# Patient Record
Sex: MALE | Race: WHITE | NOT HISPANIC OR LATINO | Employment: FULL TIME | URBAN - METROPOLITAN AREA
[De-identification: names, ages, dates, MRNs, and addresses within clinical notes are randomized per-mention and may not be internally consistent; named-entity substitution may affect disease eponyms.]

---

## 2023-08-14 ENCOUNTER — OFFICE VISIT (OUTPATIENT)
Dept: PRIMARY CARE | Facility: CLINIC | Age: 38
End: 2023-08-14
Payer: COMMERCIAL

## 2023-08-14 ENCOUNTER — LAB (OUTPATIENT)
Dept: LAB | Facility: LAB | Age: 38
End: 2023-08-14
Payer: COMMERCIAL

## 2023-08-14 VITALS
DIASTOLIC BLOOD PRESSURE: 64 MMHG | HEIGHT: 78 IN | OXYGEN SATURATION: 98 % | WEIGHT: 204.4 LBS | BODY MASS INDEX: 23.65 KG/M2 | HEART RATE: 61 BPM | TEMPERATURE: 97.5 F | SYSTOLIC BLOOD PRESSURE: 110 MMHG

## 2023-08-14 DIAGNOSIS — Z00.00 HEALTH MAINTENANCE EXAMINATION: ICD-10-CM

## 2023-08-14 DIAGNOSIS — Z23 ENCOUNTER FOR IMMUNIZATION: ICD-10-CM

## 2023-08-14 DIAGNOSIS — Z00.00 HEALTH MAINTENANCE EXAMINATION: Primary | ICD-10-CM

## 2023-08-14 LAB
ALANINE AMINOTRANSFERASE (SGPT) (U/L) IN SER/PLAS: 7 U/L (ref 10–52)
ALBUMIN (G/DL) IN SER/PLAS: 4.7 G/DL (ref 3.4–5)
ALKALINE PHOSPHATASE (U/L) IN SER/PLAS: 51 U/L (ref 33–120)
ANION GAP IN SER/PLAS: 9 MMOL/L (ref 10–20)
ASPARTATE AMINOTRANSFERASE (SGOT) (U/L) IN SER/PLAS: 19 U/L (ref 9–39)
BILIRUBIN TOTAL (MG/DL) IN SER/PLAS: 0.9 MG/DL (ref 0–1.2)
CALCIUM (MG/DL) IN SER/PLAS: 9 MG/DL (ref 8.6–10.3)
CARBON DIOXIDE, TOTAL (MMOL/L) IN SER/PLAS: 29 MMOL/L (ref 21–32)
CHLORIDE (MMOL/L) IN SER/PLAS: 107 MMOL/L (ref 98–107)
CHOLESTEROL (MG/DL) IN SER/PLAS: 160 MG/DL (ref 0–199)
CHOLESTEROL IN HDL (MG/DL) IN SER/PLAS: 69.5 MG/DL
CHOLESTEROL/HDL RATIO: 2.3
CREATININE (MG/DL) IN SER/PLAS: 0.95 MG/DL (ref 0.5–1.3)
ERYTHROCYTE DISTRIBUTION WIDTH (RATIO) BY AUTOMATED COUNT: 11.9 % (ref 11.5–14.5)
ERYTHROCYTE MEAN CORPUSCULAR HEMOGLOBIN CONCENTRATION (G/DL) BY AUTOMATED: 32.4 G/DL (ref 32–36)
ERYTHROCYTE MEAN CORPUSCULAR VOLUME (FL) BY AUTOMATED COUNT: 91 FL (ref 80–100)
ERYTHROCYTES (10*6/UL) IN BLOOD BY AUTOMATED COUNT: 5.23 X10E12/L (ref 4.5–5.9)
ESTIMATED AVERAGE GLUCOSE FOR HBA1C: 111 MG/DL
GFR MALE: >90 ML/MIN/1.73M2
GLUCOSE (MG/DL) IN SER/PLAS: 94 MG/DL (ref 74–99)
HEMATOCRIT (%) IN BLOOD BY AUTOMATED COUNT: 47.6 % (ref 41–52)
HEMOGLOBIN (G/DL) IN BLOOD: 15.4 G/DL (ref 13.5–17.5)
HEMOGLOBIN A1C/HEMOGLOBIN TOTAL IN BLOOD: 5.5 %
LDL: 73 MG/DL (ref 0–99)
LEUKOCYTES (10*3/UL) IN BLOOD BY AUTOMATED COUNT: 3.7 X10E9/L (ref 4.4–11.3)
PLATELETS (10*3/UL) IN BLOOD AUTOMATED COUNT: 197 X10E9/L (ref 150–450)
POTASSIUM (MMOL/L) IN SER/PLAS: 4.5 MMOL/L (ref 3.5–5.3)
PROTEIN TOTAL: 6.7 G/DL (ref 6.4–8.2)
SODIUM (MMOL/L) IN SER/PLAS: 140 MMOL/L (ref 136–145)
TRIGLYCERIDE (MG/DL) IN SER/PLAS: 89 MG/DL (ref 0–149)
UREA NITROGEN (MG/DL) IN SER/PLAS: 18 MG/DL (ref 6–23)
VLDL: 18 MG/DL (ref 0–40)

## 2023-08-14 PROCEDURE — 90715 TDAP VACCINE 7 YRS/> IM: CPT | Performed by: STUDENT IN AN ORGANIZED HEALTH CARE EDUCATION/TRAINING PROGRAM

## 2023-08-14 PROCEDURE — 90471 IMMUNIZATION ADMIN: CPT | Performed by: STUDENT IN AN ORGANIZED HEALTH CARE EDUCATION/TRAINING PROGRAM

## 2023-08-14 PROCEDURE — 85027 COMPLETE CBC AUTOMATED: CPT

## 2023-08-14 PROCEDURE — 36415 COLL VENOUS BLD VENIPUNCTURE: CPT

## 2023-08-14 PROCEDURE — 99395 PREV VISIT EST AGE 18-39: CPT | Performed by: STUDENT IN AN ORGANIZED HEALTH CARE EDUCATION/TRAINING PROGRAM

## 2023-08-14 PROCEDURE — 80053 COMPREHEN METABOLIC PANEL: CPT

## 2023-08-14 PROCEDURE — 80061 LIPID PANEL: CPT

## 2023-08-14 PROCEDURE — 1036F TOBACCO NON-USER: CPT | Performed by: STUDENT IN AN ORGANIZED HEALTH CARE EDUCATION/TRAINING PROGRAM

## 2023-08-14 PROCEDURE — 83036 HEMOGLOBIN GLYCOSYLATED A1C: CPT

## 2023-08-14 ASSESSMENT — PATIENT HEALTH QUESTIONNAIRE - PHQ9
2. FEELING DOWN, DEPRESSED OR HOPELESS: NOT AT ALL
1. LITTLE INTEREST OR PLEASURE IN DOING THINGS: NOT AT ALL
SUM OF ALL RESPONSES TO PHQ9 QUESTIONS 1 AND 2: 0

## 2023-08-14 NOTE — PATIENT INSTRUCTIONS
Recommendations for men annual wellness exam:   Colonoscopy at age 45 or earlier if positive family history of colon cancer   Discuss prostate cancer screening between ages 55-69 or sooner if family history of prostate cancer   One time screen for abdominal aortic aneurysm for men ages 65-75 who have ever smoked  Screening for lung cancer with low-dose CT in all adults age 55-77 who have a 30 pack-year smoking history and currently smoke or have quit within the past 15 years  Follow a healthy diet (Dash diet, Mediterranean diet)  Exercise 150 min/wk  Maintain healthy weight (BMI < 25)  Do not smoke   Alcohol in moderation (up to 2 drinks/day)   Get enough sleep (7-8 hours/night)  Make sure immunizations are up to date (influenza, pneumococcal, Tdap, shingles if age > 50)  Visit dentist twice yearly    TDaP given today

## 2023-08-17 ENCOUNTER — TELEPHONE (OUTPATIENT)
Dept: PRIMARY CARE | Facility: CLINIC | Age: 38
End: 2023-08-17
Payer: COMMERCIAL

## 2023-08-17 DIAGNOSIS — D72.819 LEUKOPENIA, UNSPECIFIED TYPE: ICD-10-CM

## 2023-08-17 NOTE — TELEPHONE ENCOUNTER
Per [facility] detailed message left on voicemail. Advised to call with questions/concerns. Lab order entered.

## 2023-08-17 NOTE — TELEPHONE ENCOUNTER
----- Message from Sachi Rg, DO sent at 8/16/2023  7:06 PM EDT -----  Please let the patient know that his white blood cell count is a little bit low.  I would like for him to recheck blood work in about 2-4 weeks.  Would you please put in an order for a CBC under leukopenia.  The rest of his blood work looks good

## 2023-08-18 ASSESSMENT — ENCOUNTER SYMPTOMS
DIZZINESS: 0
NAUSEA: 0
COUGH: 0
ABDOMINAL PAIN: 0
PALPITATIONS: 0
DIARRHEA: 0
CONSTIPATION: 0
FEVER: 0
WHEEZING: 0
NERVOUS/ANXIOUS: 0
NUMBNESS: 0
CHILLS: 0
FREQUENCY: 0
FATIGUE: 0
DYSPHORIC MOOD: 0
DYSURIA: 0
HEMATURIA: 0
HEADACHES: 0
SHORTNESS OF BREATH: 0

## 2023-09-05 ENCOUNTER — LAB (OUTPATIENT)
Dept: LAB | Facility: LAB | Age: 38
End: 2023-09-05
Payer: COMMERCIAL

## 2023-09-05 DIAGNOSIS — D72.819 LEUKOPENIA, UNSPECIFIED TYPE: ICD-10-CM

## 2023-09-05 LAB
ERYTHROCYTE DISTRIBUTION WIDTH (RATIO) BY AUTOMATED COUNT: 12.3 % (ref 11.5–14.5)
ERYTHROCYTE MEAN CORPUSCULAR HEMOGLOBIN CONCENTRATION (G/DL) BY AUTOMATED: 32.7 G/DL (ref 32–36)
ERYTHROCYTE MEAN CORPUSCULAR VOLUME (FL) BY AUTOMATED COUNT: 91 FL (ref 80–100)
ERYTHROCYTES (10*6/UL) IN BLOOD BY AUTOMATED COUNT: 4.99 X10E12/L (ref 4.5–5.9)
HEMATOCRIT (%) IN BLOOD BY AUTOMATED COUNT: 45.2 % (ref 41–52)
HEMOGLOBIN (G/DL) IN BLOOD: 14.8 G/DL (ref 13.5–17.5)
LEUKOCYTES (10*3/UL) IN BLOOD BY AUTOMATED COUNT: 4.9 X10E9/L (ref 4.4–11.3)
PLATELETS (10*3/UL) IN BLOOD AUTOMATED COUNT: 194 X10E9/L (ref 150–450)

## 2023-09-05 PROCEDURE — 85027 COMPLETE CBC AUTOMATED: CPT

## 2023-09-05 PROCEDURE — 36415 COLL VENOUS BLD VENIPUNCTURE: CPT

## 2024-08-17 ASSESSMENT — PROMIS GLOBAL HEALTH SCALE
RATE_AVERAGE_FATIGUE: MILD
RATE_QUALITY_OF_LIFE: VERY GOOD
RATE_MENTAL_HEALTH: GOOD
RATE_GENERAL_HEALTH: VERY GOOD
RATE_AVERAGE_PAIN: 4
CARRYOUT_PHYSICAL_ACTIVITIES: COMPLETELY
RATE_SOCIAL_SATISFACTION: EXCELLENT
CARRYOUT_SOCIAL_ACTIVITIES: EXCELLENT
RATE_PHYSICAL_HEALTH: VERY GOOD
EMOTIONAL_PROBLEMS: SOMETIMES

## 2024-08-19 ENCOUNTER — LAB (OUTPATIENT)
Dept: LAB | Facility: LAB | Age: 39
End: 2024-08-19
Payer: COMMERCIAL

## 2024-08-19 ENCOUNTER — APPOINTMENT (OUTPATIENT)
Dept: PRIMARY CARE | Facility: CLINIC | Age: 39
End: 2024-08-19
Payer: COMMERCIAL

## 2024-08-19 VITALS
HEIGHT: 78 IN | OXYGEN SATURATION: 99 % | SYSTOLIC BLOOD PRESSURE: 110 MMHG | WEIGHT: 206.8 LBS | DIASTOLIC BLOOD PRESSURE: 70 MMHG | BODY MASS INDEX: 23.93 KG/M2 | TEMPERATURE: 97.7 F | HEART RATE: 63 BPM

## 2024-08-19 DIAGNOSIS — Z13.31 DEPRESSION SCREENING: ICD-10-CM

## 2024-08-19 DIAGNOSIS — Z00.00 HEALTH MAINTENANCE EXAMINATION: ICD-10-CM

## 2024-08-19 DIAGNOSIS — Z00.00 HEALTH MAINTENANCE EXAMINATION: Primary | ICD-10-CM

## 2024-08-19 DIAGNOSIS — L50.0 ALLERGIC URTICARIA DUE TO INGESTED FOOD: ICD-10-CM

## 2024-08-19 LAB
ANION GAP SERPL CALC-SCNC: 12 MMOL/L (ref 10–20)
BUN SERPL-MCNC: 17 MG/DL (ref 6–23)
CALCIUM SERPL-MCNC: 9.1 MG/DL (ref 8.6–10.3)
CHLORIDE SERPL-SCNC: 105 MMOL/L (ref 98–107)
CHOLEST SERPL-MCNC: 186 MG/DL (ref 0–199)
CHOLESTEROL/HDL RATIO: 2.8
CO2 SERPL-SCNC: 28 MMOL/L (ref 21–32)
CREAT SERPL-MCNC: 1 MG/DL (ref 0.5–1.3)
EGFRCR SERPLBLD CKD-EPI 2021: >90 ML/MIN/1.73M*2
ERYTHROCYTE [DISTWIDTH] IN BLOOD BY AUTOMATED COUNT: 12.2 % (ref 11.5–14.5)
GLUCOSE SERPL-MCNC: 92 MG/DL (ref 74–99)
HCT VFR BLD AUTO: 47.1 % (ref 41–52)
HDLC SERPL-MCNC: 65.6 MG/DL
HGB BLD-MCNC: 15.2 G/DL (ref 13.5–17.5)
LDLC SERPL CALC-MCNC: 106 MG/DL
MCH RBC QN AUTO: 29.7 PG (ref 26–34)
MCHC RBC AUTO-ENTMCNC: 32.3 G/DL (ref 32–36)
MCV RBC AUTO: 92 FL (ref 80–100)
NON HDL CHOLESTEROL: 120 MG/DL (ref 0–149)
NRBC BLD-RTO: 0 /100 WBCS (ref 0–0)
PLATELET # BLD AUTO: 182 X10*3/UL (ref 150–450)
POTASSIUM SERPL-SCNC: 4.6 MMOL/L (ref 3.5–5.3)
RBC # BLD AUTO: 5.11 X10*6/UL (ref 4.5–5.9)
SODIUM SERPL-SCNC: 140 MMOL/L (ref 136–145)
TRIGL SERPL-MCNC: 74 MG/DL (ref 0–149)
VLDL: 15 MG/DL (ref 0–40)
WBC # BLD AUTO: 3.6 X10*3/UL (ref 4.4–11.3)

## 2024-08-19 PROCEDURE — 80061 LIPID PANEL: CPT

## 2024-08-19 PROCEDURE — 99395 PREV VISIT EST AGE 18-39: CPT | Performed by: STUDENT IN AN ORGANIZED HEALTH CARE EDUCATION/TRAINING PROGRAM

## 2024-08-19 PROCEDURE — G0444 DEPRESSION SCREEN ANNUAL: HCPCS | Performed by: STUDENT IN AN ORGANIZED HEALTH CARE EDUCATION/TRAINING PROGRAM

## 2024-08-19 PROCEDURE — 36415 COLL VENOUS BLD VENIPUNCTURE: CPT

## 2024-08-19 PROCEDURE — 3008F BODY MASS INDEX DOCD: CPT | Performed by: STUDENT IN AN ORGANIZED HEALTH CARE EDUCATION/TRAINING PROGRAM

## 2024-08-19 PROCEDURE — 99213 OFFICE O/P EST LOW 20 MIN: CPT | Performed by: STUDENT IN AN ORGANIZED HEALTH CARE EDUCATION/TRAINING PROGRAM

## 2024-08-19 PROCEDURE — 85027 COMPLETE CBC AUTOMATED: CPT

## 2024-08-19 PROCEDURE — 1036F TOBACCO NON-USER: CPT | Performed by: STUDENT IN AN ORGANIZED HEALTH CARE EDUCATION/TRAINING PROGRAM

## 2024-08-19 PROCEDURE — 80048 BASIC METABOLIC PNL TOTAL CA: CPT

## 2024-08-19 ASSESSMENT — ENCOUNTER SYMPTOMS
DEPRESSION: 0
SHORTNESS OF BREATH: 0
DIARRHEA: 0
CHILLS: 0
ABDOMINAL PAIN: 0
WHEEZING: 0
HEADACHES: 0
DYSPHORIC MOOD: 0
DYSURIA: 0
FATIGUE: 0
PALPITATIONS: 0
DIZZINESS: 0
CONSTIPATION: 0
COUGH: 0
NUMBNESS: 0
HEMATURIA: 0
NAUSEA: 0
FEVER: 0
OCCASIONAL FEELINGS OF UNSTEADINESS: 0
NERVOUS/ANXIOUS: 0
FREQUENCY: 0

## 2024-08-19 NOTE — PROGRESS NOTES
"Moe De La Garza  presents for his annual wellness exam.    Specialists seen by patient: dentist  -eye doctor    Hx of colon ca screening: N/A  Immunizations: Up-to-date  Diet: Follows a healthy diet  Exercise: Gets regular exercise, runs and cross trains. Biking. Works out 4-5 times per week   History of anxiety or depression: no  Alcohol abuse screen:   Number of alcoholic drinks per week: a few   How many times in the past year 5 or more drinks in a day? A few  Lung cancer screening: N/A  Smoking history: never a smoker  AAA US: not applicable  Drug use: No  Depression screen yearly (phq-2): 0  Living will/healthcare power of : Yes - reviewed    Would like to see an allergist. Breaks out in hives with \"clear liquor\". He had white wine and started to break out in hives. He gets hot and gets splotchy. It had never been wine until this last time, had white wine in the past. No issue with red wine, beer. No swelling of his lips, tongue or throat. No shortness of breath. One time in college, his lips did swell. He drinks some water and in an hour it goes away. It can be itchy. It can be on his face too. Has been going on for year but this last time was the first time in years b/c he had been avoiding clear alcohol.     Review of Systems   Constitutional:  Negative for chills, fatigue and fever.   Respiratory:  Negative for cough, shortness of breath and wheezing.    Cardiovascular:  Negative for chest pain, palpitations and leg swelling.   Gastrointestinal:  Negative for abdominal pain, constipation, diarrhea and nausea.   Genitourinary:  Negative for dysuria, frequency, hematuria and urgency.   Neurological:  Negative for dizziness, numbness and headaches.   Psychiatric/Behavioral:  Negative for dysphoric mood. The patient is not nervous/anxious.           Objective    /70 (BP Location: Left arm, Patient Position: Sitting, BP Cuff Size: Adult)   Pulse 63   Temp 36.5 °C (97.7 °F) (Temporal)   Ht 1.989 m " "(6' 6.31\")   Wt 93.8 kg (206 lb 12.8 oz)   SpO2 99%   BMI 23.71 kg/m²     Physical Exam  Constitutional:       General: He is not in acute distress.     Appearance: Normal appearance.   HENT:      Head: Normocephalic and atraumatic.      Right Ear: Tympanic membrane and ear canal normal.      Left Ear: Tympanic membrane and ear canal normal.      Mouth/Throat:      Mouth: Mucous membranes are moist.      Pharynx: No posterior oropharyngeal erythema.   Eyes:      Extraocular Movements: Extraocular movements intact.      Pupils: Pupils are equal, round, and reactive to light.   Cardiovascular:      Rate and Rhythm: Normal rate and regular rhythm.      Heart sounds: No murmur heard.  Pulmonary:      Effort: Pulmonary effort is normal. No respiratory distress.      Breath sounds: Normal breath sounds. No wheezing.   Abdominal:      General: Bowel sounds are normal.      Palpations: Abdomen is soft.      Tenderness: There is no abdominal tenderness. There is no guarding.   Musculoskeletal:         General: Normal range of motion.      Cervical back: Neck supple.   Skin:     General: Skin is warm and dry.   Neurological:      General: No focal deficit present.      Mental Status: He is alert and oriented to person, place, and time.   Psychiatric:         Mood and Affect: Mood normal.         Behavior: Behavior normal.            Problem List Items Addressed This Visit    None  Visit Diagnoses       Health maintenance examination    -  Primary    Relevant Orders    Lipid Panel    Basic Metabolic Panel    CBC    Allergic urticaria due to ingested food        Relevant Orders    Referral to Allergy             We will obtain fasting blood work.  Results will be communicated to the patient via MyChart, phone call, or a letter.   We reviewed appropriate preventative health screening guidelines.  We discussed regular aerobic exercise. We discussed proper nutrition and weight control.    Referral to allergist.  Offered EpiPen " but he would like to wait until he sees the allergist.  He is going to avoid Sachi liquors and [employer].  He had not had an incidence up until recently.  I asked that he let me know if he decides between now and when he gets in with the allergist that he wants an EpiPen    5-10 minutes were spent screening for depression using PHQ-2/PHQ-9 as documented in the chart     Sachi Rg,   08/19/24

## 2024-12-19 ENCOUNTER — APPOINTMENT (OUTPATIENT)
Dept: ALLERGY | Facility: CLINIC | Age: 39
End: 2024-12-19
Payer: COMMERCIAL

## 2024-12-19 VITALS
WEIGHT: 208 LBS | HEART RATE: 78 BPM | SYSTOLIC BLOOD PRESSURE: 133 MMHG | DIASTOLIC BLOOD PRESSURE: 68 MMHG | BODY MASS INDEX: 23.85 KG/M2

## 2024-12-19 DIAGNOSIS — L50.0 ALLERGIC URTICARIA DUE TO INGESTED FOOD: ICD-10-CM

## 2024-12-19 DIAGNOSIS — J30.89 ALLERGIC RHINITIS DUE TO OTHER ALLERGIC TRIGGER, UNSPECIFIED SEASONALITY: Primary | ICD-10-CM

## 2024-12-19 PROCEDURE — 99204 OFFICE O/P NEW MOD 45 MIN: CPT | Performed by: ALLERGY & IMMUNOLOGY

## 2024-12-19 NOTE — PROGRESS NOTES
Subjective   Patient ID:   [id number]   Moe De La Garza is a 39 y.o. male who presents for Allergies (Pt presents today for concern of alcohol allergie. Pt states anytime that he drinks he breaks out in hives ).    Chief Complaint   Patient presents with    Allergies     Pt presents today for concern of alcohol allergie. Pt states anytime that he drinks he breaks out in hives           HPI  This patient is here to evaluate for:  The patient reports onset of urticaria beginning: IN COLLEGE  17 years ago, had swollen lip, after drinking etoh the night before  He was unsure if peanut allergy  Clear alcohols - after 2 drinks, hot, itchy, red spots, red eyes.   Has been avoiding etoh.  He can't drink IPA's - next day, severe GI upset  [place] - had white wine and he got hot, hands swell, itchy.  Resolves after drinking water in 1-3  hour.     OK with light beer - no problems. Even an occasional glass of red wine is ok.     Characteristics: erythematous, pruritic, raised  Size: splotchy  Location: hands swollen, eyes bloodshot  Duration of individual hives:   Burning:  no  Bruising: no     Medications that are taken for hives:    The patient states urticaria has worsened with exposure to:   New medications: no  Infection: no  Hot or cold temps: no  Pressure: no  Exercise after eating: no  Exercise: no  Use of ASA, NSAIDS, or alcohol: YES to etoh, but ok with nsaids.  Particular foods: unsure. Does not suspect gluten allergy.     Have you had facial swelling, tongue swelling, throat swelling, trouble swallowing, abdominal cramping, cough, wheeze, or trouble breathing: no     Did you have joint pain, fever, abdominal pain, diarrhea, difficulty in swallowing or breathing associated with the hives: no     Do you have history of liver disease, thyroid disease, or autoimmune disease:   No    Is there a family history of urticaria or swelling:  No    Sister with eczema and gluten intolerance.      Pmxh: eczema from dairy allergy in  childhood; even milk chocolate bar and outgrew in teenage years.    Everytime goes home in [place], [place] ([address]), he gets allergic rhinitis and conjunctivitis managed by claritin. He pre-treats 3 days prior to going home.   No animals in the home.       Review of Systems   All other systems reviewed and are negative.        Objective     /68 (BP Location: Left arm, Patient Position: Sitting, BP Cuff Size: Adult)   Pulse 78   Wt 94.3 kg (208 lb)   BMI 23.85 kg/m²      Physical Exam  Constitutional:       General: He is not in acute distress.     Appearance: Normal appearance. He is not ill-appearing.   HENT:      Head: Normocephalic and atraumatic.      Right Ear: Tympanic membrane, ear canal and external ear normal.      Left Ear: Tympanic membrane, ear canal and external ear normal.      Nose: Nose normal. No congestion or rhinorrhea.      Mouth/Throat:      Mouth: Mucous membranes are moist.      Pharynx: Oropharynx is clear. No oropharyngeal exudate or posterior oropharyngeal erythema.   Eyes:      General:         Right eye: No discharge.         Left eye: No discharge.      Conjunctiva/sclera: Conjunctivae normal.   Cardiovascular:      Rate and Rhythm: Normal rate and regular rhythm.      Heart sounds: Normal heart sounds. No murmur heard.     No friction rub. No gallop.   Pulmonary:      Effort: Pulmonary effort is normal. No respiratory distress.      Breath sounds: Normal breath sounds. No stridor. No wheezing, rhonchi or rales.   Chest:      Chest wall: No tenderness.   Abdominal:      General: Abdomen is flat.      Palpations: Abdomen is soft.   Musculoskeletal:         General: Normal range of motion.      Cervical back: Normal range of motion and neck supple.   Lymphadenopathy:      Cervical: No cervical adenopathy.   Skin:     General: Skin is warm and dry.      Findings: No erythema, lesion or rash.   Neurological:      General: No focal deficit present.       Mental Status: He is alert. Mental status is at baseline.   Psychiatric:         Mood and Affect: Mood normal.         Behavior: Behavior normal.         Thought Content: Thought content normal.         Judgment: Judgment normal.            No current outpatient medications on file.     No current facility-administered medications for this visit.       Summary of the labs over the past 6 months:    Lab on 08/19/2024   Component Date Value Ref Range Status    Cholesterol 08/19/2024 186  0 - 199 mg/dL Final    HDL-Cholesterol 08/19/2024 65.6  mg/dL Final    Cholesterol/HDL Ratio 08/19/2024 2.8   Final    LDL Calculated 08/19/2024 106 (H)  <=99 mg/dL Final    VLDL 08/19/2024 15  0 - 40 mg/dL Final    Triglycerides 08/19/2024 74  0 - 149 mg/dL Final    Non HDL Cholesterol 08/19/2024 120  0 - 149 mg/dL Final    Glucose 08/19/2024 92  74 - 99 mg/dL Final    Sodium 08/19/2024 140  136 - 145 mmol/L Final    Potassium 08/19/2024 4.6  3.5 - 5.3 mmol/L Final    Chloride 08/19/2024 105  98 - 107 mmol/L Final    Bicarbonate 08/19/2024 28  21 - 32 mmol/L Final    Anion Gap 08/19/2024 12  10 - 20 mmol/L Final    Urea Nitrogen 08/19/2024 17  6 - 23 mg/dL Final    Creatinine 08/19/2024 1.00  0.50 - 1.30 mg/dL Final    eGFR 08/19/2024 >90  >60 mL/min/1.73m*2 Final    Calcium 08/19/2024 9.1  8.6 - 10.3 mg/dL Final    WBC 08/19/2024 3.6 (L)  4.4 - 11.3 x10*3/uL Final    nRBC 08/19/2024 0.0  0.0 - 0.0 /100 WBCs Final    RBC 08/19/2024 5.11  4.50 - 5.90 x10*6/uL Final    Hemoglobin 08/19/2024 15.2  13.5 - 17.5 g/dL Final    Hematocrit 08/19/2024 47.1  41.0 - 52.0 % Final    MCV 08/19/2024 92  80 - 100 fL Final    MCH 08/19/2024 29.7  26.0 - 34.0 pg Final    MCHC 08/19/2024 32.3  32.0 - 36.0 g/dL Final    RDW 08/19/2024 12.2  11.5 - 14.5 % Final    Platelets 08/19/2024 182  150 - 450 x10*3/uL Final         Assessment/Plan   Diagnoses and all orders for this visit:  Allergic rhinitis due to other allergic trigger, unspecified  "seasonality  Allergic urticaria due to ingested food  -     Referral to Allergy    You have a sensitivity to alcohol where, if you reach a certain threshold amount, your body has a \"pseudoallergic\" reaction leading to the hives and other effects of histamine release.   This is not due to the immune system but more of a sensitivity.    Fortunately,  the history did not identify any specific red flags of concern and no allergy skin or other blood testing is needed at this time.     No anaphylaxis occurred and so we did not need to prescribe an epinephrine auto-injector.  This issue does not progress to anaphylaxis fortunately.    We did discuss that common triggers for hives include pain medications such as NSAID's and narcotics, alcohol, and some physical triggers like heat, pressure.   In your case, alcohol is the main trigger.    I would treat future hives with a non-sedating antihistamine like Cetirizine 10mg daily prn (Zyrtec) or Allegra.      Allergic rhinitis and conjunctivitis - appears to be well managed with over the counter antihistamines; no testing was done today.     I would be happy to see this patient for follow-up as needed if the condition changes or worsens.     Kalen Juarez MD       "

## 2025-06-24 ENCOUNTER — HOSPITAL ENCOUNTER (OUTPATIENT)
Dept: RADIOLOGY | Facility: CLINIC | Age: 40
Discharge: HOME | End: 2025-06-24
Payer: COMMERCIAL

## 2025-06-24 ENCOUNTER — OFFICE VISIT (OUTPATIENT)
Dept: PRIMARY CARE | Facility: CLINIC | Age: 40
End: 2025-06-24
Payer: COMMERCIAL

## 2025-06-24 VITALS
TEMPERATURE: 97.7 F | BODY MASS INDEX: 23.32 KG/M2 | HEART RATE: 40 BPM | OXYGEN SATURATION: 97 % | SYSTOLIC BLOOD PRESSURE: 120 MMHG | WEIGHT: 203.4 LBS | DIASTOLIC BLOOD PRESSURE: 80 MMHG

## 2025-06-24 DIAGNOSIS — N50.811 PAIN IN RIGHT TESTICLE: ICD-10-CM

## 2025-06-24 DIAGNOSIS — N50.811 PAIN IN RIGHT TESTICLE: Primary | ICD-10-CM

## 2025-06-24 PROCEDURE — 76870 US EXAM SCROTUM: CPT | Performed by: RADIOLOGY

## 2025-06-24 PROCEDURE — 99214 OFFICE O/P EST MOD 30 MIN: CPT | Performed by: STUDENT IN AN ORGANIZED HEALTH CARE EDUCATION/TRAINING PROGRAM

## 2025-06-24 PROCEDURE — 1036F TOBACCO NON-USER: CPT | Performed by: STUDENT IN AN ORGANIZED HEALTH CARE EDUCATION/TRAINING PROGRAM

## 2025-06-24 PROCEDURE — 76870 US EXAM SCROTUM: CPT

## 2025-06-24 RX ORDER — DOXYCYCLINE 100 MG/1
100 CAPSULE ORAL 2 TIMES DAILY
Qty: 20 CAPSULE | Refills: 0 | Status: SHIPPED | OUTPATIENT
Start: 2025-06-24 | End: 2025-07-04

## 2025-06-24 ASSESSMENT — ENCOUNTER SYMPTOMS
OCCASIONAL FEELINGS OF UNSTEADINESS: 0
CHILLS: 0
COUGH: 0
FEVER: 0
FREQUENCY: 0
DEPRESSION: 0
SHORTNESS OF BREATH: 0
DIARRHEA: 0
NUMBNESS: 0
CONSTIPATION: 0
ABDOMINAL PAIN: 0
DYSURIA: 0
NERVOUS/ANXIOUS: 0
DIZZINESS: 0
NAUSEA: 0
HEADACHES: 0
WHEEZING: 0
HEMATURIA: 0
DYSPHORIC MOOD: 0
PALPITATIONS: 0
FATIGUE: 0

## 2025-06-24 ASSESSMENT — PATIENT HEALTH QUESTIONNAIRE - PHQ9
1. LITTLE INTEREST OR PLEASURE IN DOING THINGS: NOT AT ALL
SUM OF ALL RESPONSES TO PHQ9 QUESTIONS 1 AND 2: 0
2. FEELING DOWN, DEPRESSED OR HOPELESS: NOT AT ALL

## 2025-06-24 NOTE — PROGRESS NOTES
Subjective   Patient ID: Moe De La Garza is a 40 y.o. male who presents for Testicle Pain (Right, swelling, tenderness/Radiation noted into groin,lower abdomen/Started 4 days ago).    HPI   Patient here with right sided testicular pain for 4 days. No injury or recent injury. It is swollen. No change in color. Really tender to palpation.    Started Friday on the way camping and by the time he got home on Sunday he noticed it was very tender. No bite.     No other symptoms. No increased frequency of urination. No dysuria.  He is sexually active, no risk of STDs.     Has tried compression shorts.     Has never had this before.       Review of Systems   Constitutional:  Negative for chills, fatigue and fever.   Respiratory:  Negative for cough, shortness of breath and wheezing.    Cardiovascular:  Negative for chest pain, palpitations and leg swelling.   Gastrointestinal:  Negative for abdominal pain, constipation, diarrhea and nausea.   Genitourinary:  Negative for dysuria, frequency, hematuria and urgency.   Neurological:  Negative for dizziness, numbness and headaches.   Psychiatric/Behavioral:  Negative for dysphoric mood. The patient is not nervous/anxious.        Objective   /80 (BP Location: Right arm, Patient Position: Sitting, BP Cuff Size: Adult)   Pulse (!) 40   Temp 36.5 °C (97.7 °F) (Temporal)   Wt 92.3 kg (203 lb 6.4 oz)   SpO2 97%   BMI 23.32 kg/m²     Physical Exam  Vitals reviewed. Chaperone present: Declined.   Constitutional:       Appearance: Normal appearance.   HENT:      Head: Normocephalic and atraumatic.   Eyes:      Extraocular Movements: Extraocular movements intact.      Pupils: Pupils are equal, round, and reactive to light.   Pulmonary:      Effort: Pulmonary effort is normal.   Genitourinary:     Testes:         Right: Tenderness and swelling present. Mass not present. Right testis is descended.         Left: Mass, tenderness or swelling not present.      Epididymis:      Right:  Inflamed. Tenderness present.      Left: Not inflamed. No tenderness.   Skin:     General: Skin is warm and dry.   Neurological:      General: No focal deficit present.      Mental Status: He is alert.   Psychiatric:         Mood and Affect: Mood normal.         Behavior: Behavior normal.         Thought Content: Thought content normal.         Assessment/Plan   Problem List Items Addressed This Visit    None  Visit Diagnoses         Pain in right testicle    -  Primary    Relevant Medications    doxycycline (Vibramycin) 100 mg capsule    Other Relevant Orders    US scrotum (Completed)          Suspect epididymitis but given the acute onset and significant pain he has been having, will check stat ultrasound to rule out testicular torsion.  Will go ahead and send in the doxycycline for him to start as well.    Sachi Rg, DO  6/24/2025

## 2025-07-23 DIAGNOSIS — N45.1 EPIDIDYMITIS: Primary | ICD-10-CM

## 2025-07-23 RX ORDER — SULFAMETHOXAZOLE AND TRIMETHOPRIM 800; 160 MG/1; MG/1
1 TABLET ORAL 2 TIMES DAILY
Qty: 10 TABLET | Refills: 0 | Status: CANCELLED | OUTPATIENT
Start: 2025-07-23 | End: 2025-07-28

## 2025-07-23 RX ORDER — SULFAMETHOXAZOLE AND TRIMETHOPRIM 800; 160 MG/1; MG/1
1 TABLET ORAL 2 TIMES DAILY
Qty: 28 TABLET | Refills: 0 | Status: SHIPPED | OUTPATIENT
Start: 2025-07-23 | End: 2025-08-06

## 2025-08-14 ENCOUNTER — APPOINTMENT (OUTPATIENT)
Dept: UROLOGY | Facility: CLINIC | Age: 40
End: 2025-08-14
Payer: COMMERCIAL

## 2025-08-14 VITALS — HEART RATE: 55 BPM | DIASTOLIC BLOOD PRESSURE: 67 MMHG | SYSTOLIC BLOOD PRESSURE: 126 MMHG

## 2025-08-14 DIAGNOSIS — N50.811 PAIN IN RIGHT TESTICLE: Primary | ICD-10-CM

## 2025-08-14 DIAGNOSIS — R39.9 URINARY SYMPTOM OR SIGN: ICD-10-CM

## 2025-08-14 LAB
POC APPEARANCE, URINE: CLEAR
POC BILIRUBIN, URINE: NEGATIVE
POC BLOOD, URINE: NEGATIVE
POC COLOR, URINE: YELLOW
POC GLUCOSE, URINE: NEGATIVE MG/DL
POC KETONES, URINE: ABNORMAL MG/DL
POC LEUKOCYTES, URINE: NEGATIVE
POC NITRITE,URINE: NEGATIVE
POC PH, URINE: 6.5 PH
POC PROTEIN, URINE: NEGATIVE MG/DL
POC SPECIFIC GRAVITY, URINE: 1.02
POC UROBILINOGEN, URINE: 0.2 EU/DL

## 2025-08-14 PROCEDURE — 81003 URINALYSIS AUTO W/O SCOPE: CPT | Performed by: UROLOGY

## 2025-08-14 PROCEDURE — 1036F TOBACCO NON-USER: CPT | Performed by: UROLOGY

## 2025-08-14 PROCEDURE — 99204 OFFICE O/P NEW MOD 45 MIN: CPT | Performed by: UROLOGY

## 2025-08-17 ASSESSMENT — PROMIS GLOBAL HEALTH SCALE
RATE_AVERAGE_PAIN: 4
RATE_AVERAGE_FATIGUE: MODERATE
RATE_SOCIAL_SATISFACTION: VERY GOOD
RATE_PHYSICAL_HEALTH: VERY GOOD
RATE_GENERAL_HEALTH: GOOD
EMOTIONAL_PROBLEMS: SOMETIMES
RATE_QUALITY_OF_LIFE: VERY GOOD
RATE_MENTAL_HEALTH: GOOD
CARRYOUT_SOCIAL_ACTIVITIES: EXCELLENT
CARRYOUT_PHYSICAL_ACTIVITIES: COMPLETELY

## 2025-08-18 ASSESSMENT — ENCOUNTER SYMPTOMS
FREQUENCY: 0
FEVER: 0
DIZZINESS: 0
DYSURIA: 0
COUGH: 0
HEADACHES: 0
CHILLS: 0
NERVOUS/ANXIOUS: 0
NUMBNESS: 0
WHEEZING: 0
NAUSEA: 0
FATIGUE: 0
CONSTIPATION: 0
SHORTNESS OF BREATH: 0
HEMATURIA: 0
DYSPHORIC MOOD: 0
ABDOMINAL PAIN: 0
PALPITATIONS: 0
DIARRHEA: 0

## 2025-08-19 ENCOUNTER — APPOINTMENT (OUTPATIENT)
Dept: PRIMARY CARE | Facility: CLINIC | Age: 40
End: 2025-08-19
Payer: COMMERCIAL

## 2025-08-19 VITALS
BODY MASS INDEX: 23.4 KG/M2 | HEART RATE: 67 BPM | HEIGHT: 78 IN | SYSTOLIC BLOOD PRESSURE: 100 MMHG | DIASTOLIC BLOOD PRESSURE: 70 MMHG | TEMPERATURE: 97.7 F | WEIGHT: 202.2 LBS | OXYGEN SATURATION: 97 %

## 2025-08-19 DIAGNOSIS — Z13.6 ENCOUNTER FOR SCREENING FOR CORONARY ARTERY DISEASE: ICD-10-CM

## 2025-08-19 DIAGNOSIS — Z13.31 DEPRESSION SCREENING: ICD-10-CM

## 2025-08-19 DIAGNOSIS — Z00.00 HEALTH MAINTENANCE EXAMINATION: Primary | ICD-10-CM

## 2025-08-19 PROCEDURE — 96127 BRIEF EMOTIONAL/BEHAV ASSMT: CPT | Performed by: STUDENT IN AN ORGANIZED HEALTH CARE EDUCATION/TRAINING PROGRAM

## 2025-08-19 PROCEDURE — 99396 PREV VISIT EST AGE 40-64: CPT | Performed by: STUDENT IN AN ORGANIZED HEALTH CARE EDUCATION/TRAINING PROGRAM

## 2025-08-19 PROCEDURE — 1036F TOBACCO NON-USER: CPT | Performed by: STUDENT IN AN ORGANIZED HEALTH CARE EDUCATION/TRAINING PROGRAM

## 2025-08-19 PROCEDURE — 3008F BODY MASS INDEX DOCD: CPT | Performed by: STUDENT IN AN ORGANIZED HEALTH CARE EDUCATION/TRAINING PROGRAM

## 2025-08-19 ASSESSMENT — ANXIETY QUESTIONNAIRES
3. WORRYING TOO MUCH ABOUT DIFFERENT THINGS: NOT AT ALL
7. FEELING AFRAID AS IF SOMETHING AWFUL MIGHT HAPPEN: NOT AT ALL
2. NOT BEING ABLE TO STOP OR CONTROL WORRYING: NOT AT ALL
5. BEING SO RESTLESS THAT IT IS HARD TO SIT STILL: SEVERAL DAYS
1. FEELING NERVOUS, ANXIOUS, OR ON EDGE: SEVERAL DAYS
4. TROUBLE RELAXING: NOT AT ALL
GAD7 TOTAL SCORE: 2
6. BECOMING EASILY ANNOYED OR IRRITABLE: NOT AT ALL

## 2025-08-19 ASSESSMENT — PATIENT HEALTH QUESTIONNAIRE - PHQ9
SUM OF ALL RESPONSES TO PHQ9 QUESTIONS 1 AND 2: 0
3. TROUBLE FALLING OR STAYING ASLEEP OR SLEEPING TOO MUCH: SEVERAL DAYS
4. FEELING TIRED OR HAVING LITTLE ENERGY: SEVERAL DAYS
2. FEELING DOWN, DEPRESSED OR HOPELESS: NOT AT ALL
1. LITTLE INTEREST OR PLEASURE IN DOING THINGS: NOT AT ALL

## 2025-08-19 ASSESSMENT — ENCOUNTER SYMPTOMS
OCCASIONAL FEELINGS OF UNSTEADINESS: 0
DEPRESSION: 0

## 2025-09-03 ENCOUNTER — APPOINTMENT (OUTPATIENT)
Dept: PRIMARY CARE | Facility: CLINIC | Age: 40
End: 2025-09-03
Payer: COMMERCIAL

## 2025-09-03 ASSESSMENT — ENCOUNTER SYMPTOMS
FATIGUE: 0
DEPRESSION: 0
CHILLS: 0
WHEEZING: 0
PALPITATIONS: 0
NAUSEA: 0
COUGH: 0
DYSPHORIC MOOD: 0
OCCASIONAL FEELINGS OF UNSTEADINESS: 0
HEMATURIA: 0
NUMBNESS: 0
DIZZINESS: 0
DYSURIA: 0
NERVOUS/ANXIOUS: 0
FEVER: 0
HEADACHES: 0
DIARRHEA: 0
CONSTIPATION: 0
SHORTNESS OF BREATH: 0
FREQUENCY: 0
ABDOMINAL PAIN: 0

## 2025-12-03 ENCOUNTER — APPOINTMENT (OUTPATIENT)
Dept: PRIMARY CARE | Facility: CLINIC | Age: 40
End: 2025-12-03
Payer: COMMERCIAL